# Patient Record
Sex: MALE | Race: WHITE | Employment: OTHER | URBAN - NONMETROPOLITAN AREA
[De-identification: names, ages, dates, MRNs, and addresses within clinical notes are randomized per-mention and may not be internally consistent; named-entity substitution may affect disease eponyms.]

---

## 2024-02-11 ENCOUNTER — APPOINTMENT (OUTPATIENT)
Dept: CT IMAGING | Age: 69
End: 2024-02-11
Attending: EMERGENCY MEDICINE
Payer: MEDICARE

## 2024-02-11 ENCOUNTER — HOSPITAL ENCOUNTER (EMERGENCY)
Age: 69
Discharge: HOME | End: 2024-02-11
Attending: EMERGENCY MEDICINE
Payer: MEDICARE

## 2024-02-11 VITALS
DIASTOLIC BLOOD PRESSURE: 84 MMHG | SYSTOLIC BLOOD PRESSURE: 145 MMHG | TEMPERATURE: 97.9 F | HEIGHT: 72 IN | BODY MASS INDEX: 31.15 KG/M2 | WEIGHT: 230 LBS | OXYGEN SATURATION: 98 % | RESPIRATION RATE: 16 BRPM | HEART RATE: 70 BPM

## 2024-02-11 DIAGNOSIS — S09.92XA INJURY OF NOSE, INITIAL ENCOUNTER: ICD-10-CM

## 2024-02-11 DIAGNOSIS — S09.90XA INJURY OF HEAD, INITIAL ENCOUNTER: ICD-10-CM
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
## 2024-02-11 PROCEDURE — 70486 CT MAXILLOFACIAL W/O DYE: CPT

## 2024-02-11 PROCEDURE — 6370000000 HC RX 637 (ALT 250 FOR IP): Performed by: EMERGENCY MEDICINE

## 2024-02-11 PROCEDURE — 90715 TDAP VACCINE 7 YRS/> IM: CPT | Performed by: EMERGENCY MEDICINE

## 2024-02-11 PROCEDURE — 70450 CT HEAD/BRAIN W/O DYE: CPT

## 2024-02-11 PROCEDURE — 99284 EMERGENCY DEPT VISIT MOD MDM: CPT | Performed by: EMERGENCY MEDICINE

## 2024-02-11 PROCEDURE — 90471 IMMUNIZATION ADMIN: CPT | Performed by: EMERGENCY MEDICINE

## 2024-02-11 PROCEDURE — 72125 CT NECK SPINE W/O DYE: CPT

## 2024-02-11 PROCEDURE — 6360000002 HC RX W HCPCS: Performed by: EMERGENCY MEDICINE

## 2024-02-11 RX ORDER — ONDANSETRON 4 MG/1
4 TABLET, ORALLY DISINTEGRATING ORAL ONCE
Status: COMPLETED | OUTPATIENT
Start: 2024-02-11 | End: 2024-02-11

## 2024-02-11 RX ORDER — ACETAMINOPHEN 500 MG
1000 TABLET ORAL ONCE
Status: COMPLETED | OUTPATIENT
Start: 2024-02-11 | End: 2024-02-11

## 2024-02-11 RX ADMIN — TETANUS TOXOID, REDUCED DIPHTHERIA TOXOID AND ACELLULAR PERTUSSIS VACCINE, ADSORBED 0.5 ML: 5; 2.5; 8; 8; 2.5 SUSPENSION INTRAMUSCULAR at 15:06

## 2024-02-11 RX ADMIN — ACETAMINOPHEN 1000 MG: 500 TABLET ORAL at 15:06

## 2024-02-11 RX ADMIN — ONDANSETRON 4 MG: 4 TABLET, ORALLY DISINTEGRATING ORAL at 15:06

## 2024-02-11 NOTE — ED PROVIDER NOTES
[facility] URBANA      TRIAGE CHIEF COMPLAINT:   Fall (Pt states he was taking a walk and tripped and fell. Abrasion noted to the tip of nose and bridge of nose . Some bleeding noted from abrasion . Pt denies LOC, denies head or neck pain)      [place]:  Malvin Fitzgerald is a 68 y.o. male that presents with complaint of fall facial injury, headache.  Patient prior to arrival was walking when he tripped and fell over a curb hit his face, nose had some blood from his nose, of the skin not the nostrils.  He did not pass out has a headache.  He takes aspirin, no other blood thinners.  Did not pass out no neck pain chest pain shortness of breath abdominal pain no other weakness or numbness no dizziness just a slight headache.  He walked in without difficulty.  Not sure about tetanus shot..    REVIEW OF SYSTEMS:  At least 10 systems reviewed and otherwise acutely negative except as in the [place].    Review of Systems   Constitutional: Negative.    HENT:  Positive for facial swelling and nosebleeds.    Eyes: Negative.    Respiratory: Negative.     Cardiovascular: Negative.    Gastrointestinal: Negative.    Endocrine: Negative.    Genitourinary: Negative.    Musculoskeletal: Negative.    Skin:  Positive for wound.   Allergic/Immunologic: Negative.    Neurological:  Positive for headaches.   Hematological: Negative.    Psychiatric/Behavioral: Negative.  Negative for agitation.    All other systems reviewed and are negative.      Past Medical History:   Diagnosis Date    Hypertension      Past Surgical History:   Procedure Laterality Date    BACK SURGERY      HERNIA REPAIR       History reviewed. No pertinent family history.  Social History     Socioeconomic History    Marital status:      Spouse name: Not on file    Number of children: Not on file    Years of education: Not on file    Highest education level: Not on file   Occupational History    Not on file   Tobacco Use    Smoking status: Never

## 2024-02-13 ENCOUNTER — OFFICE VISIT (OUTPATIENT)
Dept: INTERNAL MEDICINE CLINIC | Age: 69
End: 2024-02-13
Payer: MEDICARE

## 2024-02-13 VITALS
SYSTOLIC BLOOD PRESSURE: 142 MMHG | BODY MASS INDEX: 32.64 KG/M2 | HEIGHT: 72 IN | DIASTOLIC BLOOD PRESSURE: 86 MMHG | OXYGEN SATURATION: 99 % | WEIGHT: 241 LBS | HEART RATE: 65 BPM

## 2024-02-13 DIAGNOSIS — E87.6 HYPOKALEMIA: ICD-10-CM

## 2024-02-13 DIAGNOSIS — S09.93XD FACIAL INJURY, SUBSEQUENT ENCOUNTER: Primary | ICD-10-CM

## 2024-02-13 DIAGNOSIS — H57.02 PUPILS OF DIFFERENT SIZE: ICD-10-CM

## 2024-02-13 PROCEDURE — 99204 OFFICE O/P NEW MOD 45 MIN: CPT | Performed by: PHYSICIAN ASSISTANT

## 2024-02-13 PROCEDURE — 3017F COLORECTAL CA SCREEN DOC REV: CPT | Performed by: PHYSICIAN ASSISTANT

## 2024-02-13 PROCEDURE — G8484 FLU IMMUNIZE NO ADMIN: HCPCS | Performed by: PHYSICIAN ASSISTANT

## 2024-02-13 PROCEDURE — 1036F TOBACCO NON-USER: CPT | Performed by: PHYSICIAN ASSISTANT

## 2024-02-13 PROCEDURE — G8427 DOCREV CUR MEDS BY ELIG CLIN: HCPCS | Performed by: PHYSICIAN ASSISTANT

## 2024-02-13 PROCEDURE — G8417 CALC BMI ABV UP PARAM F/U: HCPCS | Performed by: PHYSICIAN ASSISTANT

## 2024-02-13 PROCEDURE — 1123F ACP DISCUSS/DSCN MKR DOCD: CPT | Performed by: PHYSICIAN ASSISTANT

## 2024-02-13 RX ORDER — EMPAGLIFLOZIN 25 MG/1
25 TABLET, FILM COATED ORAL EVERY MORNING
COMMUNITY

## 2024-02-13 RX ORDER — OMEPRAZOLE 40 MG/1
40 CAPSULE, DELAYED RELEASE ORAL 2 TIMES DAILY
COMMUNITY

## 2024-02-13 RX ORDER — POTASSIUM CHLORIDE 750 MG/1
10 CAPSULE, EXTENDED RELEASE ORAL DAILY
COMMUNITY
End: 2024-02-13 | Stop reason: SDUPTHER

## 2024-02-13 RX ORDER — ALLOPURINOL 300 MG/1
450 TABLET ORAL EVERY EVENING
COMMUNITY

## 2024-02-13 RX ORDER — POTASSIUM CHLORIDE 750 MG/1
10 CAPSULE, EXTENDED RELEASE ORAL DAILY
Qty: 90 CAPSULE | Refills: 0 | Status: SHIPPED | OUTPATIENT
Start: 2024-02-13

## 2024-02-13 RX ORDER — METOPROLOL SUCCINATE 25 MG/1
25 TABLET, EXTENDED RELEASE ORAL DAILY
COMMUNITY

## 2024-02-13 RX ORDER — AMITRIPTYLINE HYDROCHLORIDE 25 MG/1
1 TABLET, FILM COATED ORAL NIGHTLY
COMMUNITY

## 2024-02-13 RX ORDER — MELOXICAM 15 MG/1
15 TABLET ORAL DAILY
COMMUNITY

## 2024-02-13 RX ORDER — TESTOSTERONE 20.25 MG/1.25G
GEL TOPICAL
COMMUNITY
Start: 2024-01-09

## 2024-02-13 RX ORDER — OXYBUTYNIN CHLORIDE 5 MG/1
10 TABLET, EXTENDED RELEASE ORAL EVERY EVENING
COMMUNITY

## 2024-02-13 RX ORDER — BUMETANIDE 2 MG/1
2 TABLET ORAL SEE ADMIN INSTRUCTIONS
COMMUNITY

## 2024-02-13 RX ORDER — DULAGLUTIDE 1.5 MG/.5ML
1.5 INJECTION, SOLUTION SUBCUTANEOUS WEEKLY
COMMUNITY

## 2024-02-13 RX ORDER — BLOOD SUGAR DIAGNOSTIC
STRIP MISCELLANEOUS
COMMUNITY
Start: 2023-12-28

## 2024-02-13 RX ORDER — NIFEDIPINE 60 MG/1
60 TABLET, EXTENDED RELEASE ORAL DAILY
COMMUNITY

## 2024-02-13 RX ORDER — IRBESARTAN 300 MG/1
300 TABLET ORAL DAILY
COMMUNITY

## 2024-02-13 RX ORDER — ATORVASTATIN CALCIUM 40 MG/1
1 TABLET, FILM COATED ORAL DAILY
COMMUNITY

## 2024-02-13 NOTE — PROGRESS NOTES
Malvin Fitzgerald (:  1955) is a 68 y.o. male,New patient, here for evaluation of the following chief complaint(s):    No chief complaint on file.    This is my first patient encounter with Malvin Fitzgerald; chart reviewed.     SUBJECTIVE/OBJECTIVE:  HPI  Malvin Fitzgerald is a pleasant 68 y.o. male presenting to clinic today for ER follow-up/fall.     Patient seen in emergency department 2024 for fall, patient states he was taking a walk and tripped and fell forward onto his face, had abrasions to nose; patient denied syncope or headache.  Patient was given Zofran, Tylenol and updated tetanus booster.  CT imaging of head and neck was normal without acute changes.  Patient reports that after emergency department visit, they noticed that his right pupil seemed larger than his left.  Patient denies any double vision or vision changes, states that his headache has resolved, denies any lightheadedness, dizziness or syncope.  Denies any otic drainage or nasal drainage.  Denies any repeat trauma.  Denies any nausea or vomiting.  Patient states he had corneal replacement surgery and cataract surgery as a child after a traumatic injury to his right eye.  Is not sure if his right pupil is usually larger than his left.  Reports that bruising has spread to periorbital areas and nose, denies pain at this time.        CT head and facial bones 2024:  IMPRESSION:  1. No acute intracranial abnormality.  2. No evidence of acute facial bone fracture.  3. Chronic left maxillary sinusitis.    CT cervical spine 2024:  IMPRESSION:  1. Straightening of the normal curvature with normal alignment.  No acute  fracture.  2. Multilevel degenerative disc and asymmetric degenerative joint disease  (left greater than right).         Allergies   Allergen Reactions    Elemental Sulfur     Sulphasomidine Hives    Augmentin [Amoxicillin-Pot Clavulanate] Rash    Pcn [Penicillins] Rash       Current Outpatient Medications

## 2024-05-05 DIAGNOSIS — E87.6 HYPOKALEMIA: ICD-10-CM

## 2024-05-06 RX ORDER — POTASSIUM CHLORIDE 750 MG/1
10 CAPSULE, EXTENDED RELEASE ORAL DAILY
Qty: 90 CAPSULE | Refills: 0 | OUTPATIENT
Start: 2024-05-06

## 2025-03-07 ENCOUNTER — OFFICE VISIT (OUTPATIENT)
Age: 70
End: 2025-03-07
Payer: MEDICARE

## 2025-03-07 VITALS
SYSTOLIC BLOOD PRESSURE: 138 MMHG | BODY MASS INDEX: 32.51 KG/M2 | WEIGHT: 240 LBS | HEART RATE: 57 BPM | DIASTOLIC BLOOD PRESSURE: 80 MMHG | HEIGHT: 72 IN | OXYGEN SATURATION: 99 %

## 2025-03-07 DIAGNOSIS — Z80.42 FAMILY HISTORY OF PROSTATE CANCER IN FATHER: ICD-10-CM

## 2025-03-07 DIAGNOSIS — R19.09 SUPRAPUBIC MASS: Primary | ICD-10-CM

## 2025-03-07 PROCEDURE — 3017F COLORECTAL CA SCREEN DOC REV: CPT | Performed by: NURSE PRACTITIONER

## 2025-03-07 PROCEDURE — 99212 OFFICE O/P EST SF 10 MIN: CPT | Performed by: NURSE PRACTITIONER

## 2025-03-07 PROCEDURE — 1036F TOBACCO NON-USER: CPT | Performed by: NURSE PRACTITIONER

## 2025-03-07 PROCEDURE — G8427 DOCREV CUR MEDS BY ELIG CLIN: HCPCS | Performed by: NURSE PRACTITIONER

## 2025-03-07 PROCEDURE — G8417 CALC BMI ABV UP PARAM F/U: HCPCS | Performed by: NURSE PRACTITIONER

## 2025-03-07 PROCEDURE — 1160F RVW MEDS BY RX/DR IN RCRD: CPT | Performed by: NURSE PRACTITIONER

## 2025-03-07 PROCEDURE — 1123F ACP DISCUSS/DSCN MKR DOCD: CPT | Performed by: NURSE PRACTITIONER

## 2025-03-07 PROCEDURE — 1159F MED LIST DOCD IN RCRD: CPT | Performed by: NURSE PRACTITIONER

## 2025-03-07 ASSESSMENT — ENCOUNTER SYMPTOMS
COUGH: 0
SORE THROAT: 0
EYE PAIN: 0
DIARRHEA: 0
NAIL CHANGES: 0
VOMITING: 0
ABDOMINAL PAIN: 0
RHINORRHEA: 0
SHORTNESS OF BREATH: 0

## 2025-03-07 NOTE — PROGRESS NOTES
Malvin Fitzgerald (:  1955) is a 69 y.o. male,Established patient, here for evaluation of the following chief complaint(s):    Other (Growth on penis. Only noticed when has erection. Firtst notice 1 week ago. )      SUBJECTIVE/OBJECTIVE:  Pt presents with c/o as stated below-     Skin Problem  This is a new problem. The current episode started 1 to 4 weeks ago (one week). The problem is unchanged. Location: growth on groin at base of penis can only be felt when penis is erect. He was exposed to nothing. Pertinent negatives include no anorexia, congestion, cough, diarrhea, eye pain, facial edema, fatigue, fever, joint pain, nail changes, rhinorrhea, shortness of breath, sore throat or vomiting.       Allergies   Allergen Reactions    Elemental Sulfur     Sulphasomidine Hives    Augmentin [Amoxicillin-Pot Clavulanate] Rash    Cephalosporins Rash    Pcn [Penicillins] Rash        Current Outpatient Medications   Medication Sig Dispense Refill    allopurinol (ZYLOPRIM) 300 MG tablet Take 1.5 tablets by mouth every evening      amitriptyline (ELAVIL) 25 MG tablet Take 1 tablet by mouth nightly      atorvastatin (LIPITOR) 40 MG tablet Take 1 tablet by mouth daily      bumetanide (BUMEX) 2 MG tablet Take 1 tablet by mouth See Admin Instructions TAKE 1 TABLET BY MOUTH 3 DAYS A WEEK AND 1/2 TABLETS BY MOUTH 4 DAYS A WEEK      TRULICITY 1.5 MG/0.5ML SC injection Inject 0.5 mLs into the skin once a week      JARDIANCE 25 MG tablet Take 1 tablet by mouth every morning      ONETOUCH ULTRA strip TEST BLOOD SUGAR ONCE PER DAY      irbesartan (AVAPRO) 300 MG tablet Take 1 tablet by mouth daily      meloxicam (MOBIC) 15 MG tablet Take 1 tablet by mouth daily      metFORMIN (GLUCOPHAGE) 500 MG tablet Take 2 tablets by mouth 2 times daily      metoprolol succinate (TOPROL XL) 25 MG extended release tablet Take 1 tablet by mouth daily      NIFEdipine (PROCARDIA XL) 60 MG extended release tablet Take 1 tablet by mouth daily

## 2025-03-20 ENCOUNTER — OFFICE VISIT (OUTPATIENT)
Age: 70
End: 2025-03-20
Payer: MEDICARE

## 2025-03-20 VITALS
SYSTOLIC BLOOD PRESSURE: 132 MMHG | HEART RATE: 70 BPM | OXYGEN SATURATION: 96 % | TEMPERATURE: 98.8 F | DIASTOLIC BLOOD PRESSURE: 68 MMHG

## 2025-03-20 DIAGNOSIS — R09.89 SYMPTOMS OF UPPER RESPIRATORY INFECTION (URI): Primary | ICD-10-CM

## 2025-03-20 DIAGNOSIS — E11.69 TYPE 2 DIABETES MELLITUS WITH OTHER SPECIFIED COMPLICATION, WITHOUT LONG-TERM CURRENT USE OF INSULIN: ICD-10-CM

## 2025-03-20 DIAGNOSIS — R35.0 URINARY FREQUENCY: ICD-10-CM

## 2025-03-20 LAB
BILIRUBIN, POC: NEGATIVE
BLOOD URINE, POC: NEGATIVE
CHP ED QC CHECK: NORMAL
CLARITY, POC: CLEAR
COLOR, POC: YELLOW
GLUCOSE BLD-MCNC: 102 MG/DL
GLUCOSE URINE, POC: NEGATIVE MG/DL
INFLUENZA A ANTIGEN, POC: NEGATIVE
INFLUENZA B ANTIGEN, POC: NEGATIVE
KETONES, POC: NEGATIVE MG/DL
LEUKOCYTE EST, POC: NEGATIVE
LOT EXPIRE DATE: NORMAL
LOT KIT NUMBER: NORMAL
NITRITE, POC: NEGATIVE
PH, POC: 5.5
PROTEIN, POC: 30 MG/DL
SARS-COV-2, POC: NORMAL
SPECIFIC GRAVITY, POC: 1.05
UROBILINOGEN, POC: 0.2 MG/DL
VALID INTERNAL CONTROL: NORMAL
VENDOR AND KIT NAME POC: NORMAL

## 2025-03-20 PROCEDURE — G8427 DOCREV CUR MEDS BY ELIG CLIN: HCPCS | Performed by: PHYSICIAN ASSISTANT

## 2025-03-20 PROCEDURE — 1123F ACP DISCUSS/DSCN MKR DOCD: CPT | Performed by: PHYSICIAN ASSISTANT

## 2025-03-20 PROCEDURE — G8417 CALC BMI ABV UP PARAM F/U: HCPCS | Performed by: PHYSICIAN ASSISTANT

## 2025-03-20 PROCEDURE — 2022F DILAT RTA XM EVC RTNOPTHY: CPT | Performed by: PHYSICIAN ASSISTANT

## 2025-03-20 PROCEDURE — 82962 GLUCOSE BLOOD TEST: CPT | Performed by: PHYSICIAN ASSISTANT

## 2025-03-20 PROCEDURE — 3046F HEMOGLOBIN A1C LEVEL >9.0%: CPT | Performed by: PHYSICIAN ASSISTANT

## 2025-03-20 PROCEDURE — 1159F MED LIST DOCD IN RCRD: CPT | Performed by: PHYSICIAN ASSISTANT

## 2025-03-20 PROCEDURE — 1036F TOBACCO NON-USER: CPT | Performed by: PHYSICIAN ASSISTANT

## 2025-03-20 PROCEDURE — 81002 URINALYSIS NONAUTO W/O SCOPE: CPT | Performed by: PHYSICIAN ASSISTANT

## 2025-03-20 PROCEDURE — 3017F COLORECTAL CA SCREEN DOC REV: CPT | Performed by: PHYSICIAN ASSISTANT

## 2025-03-20 PROCEDURE — 99214 OFFICE O/P EST MOD 30 MIN: CPT | Performed by: PHYSICIAN ASSISTANT

## 2025-03-20 PROCEDURE — 87428 SARSCOV & INF VIR A&B AG IA: CPT | Performed by: PHYSICIAN ASSISTANT

## 2025-03-20 RX ORDER — BENZONATATE 200 MG/1
200 CAPSULE ORAL 3 TIMES DAILY PRN
Qty: 30 CAPSULE | Refills: 0 | Status: SHIPPED | OUTPATIENT
Start: 2025-03-20 | End: 2025-03-30

## 2025-03-20 RX ORDER — FLUTICASONE PROPIONATE 50 MCG
2 SPRAY, SUSPENSION (ML) NASAL DAILY
Qty: 16 G | Refills: 0 | Status: SHIPPED | OUTPATIENT
Start: 2025-03-20

## 2025-03-20 RX ORDER — ALBUTEROL SULFATE 90 UG/1
2 INHALANT RESPIRATORY (INHALATION) EVERY 4 HOURS PRN
Qty: 18 G | Refills: 0 | Status: SHIPPED | OUTPATIENT
Start: 2025-03-20

## 2025-03-20 ASSESSMENT — ENCOUNTER SYMPTOMS
COLOR CHANGE: 0
EYE DISCHARGE: 0
BLOOD IN STOOL: 0
CONSTIPATION: 0
ABDOMINAL PAIN: 0
RHINORRHEA: 0
SORE THROAT: 1
EYE REDNESS: 0
VOMITING: 0
PHOTOPHOBIA: 0
WHEEZING: 0
EYE PAIN: 0
CHEST TIGHTNESS: 1
COUGH: 1
SHORTNESS OF BREATH: 0
DIARRHEA: 0
NAUSEA: 0
BACK PAIN: 0

## 2025-03-20 NOTE — PROGRESS NOTES
Malvin Fitzgerald (:  1955) is a 69 y.o. male,Established patient, here for evaluation of the following chief complaint(s):    Cough, Pharyngitis, Chest Congestion, and Nasal Congestion      ASSESSMENT/PLAN:  Assessment & Plan  1. Viral infection.  Symptoms: dry cough, sore throat, chest congestion (2 days). Exam: faint lung congestion, fluid behind eardrums. COVID-19 and influenza tests: negative. Prescribe inhaler (every 4 hours), Tessalon Perles (every 8 hours as needed), Flonase (2 sprays each nostril daily). Recommend warm salt water gargles and sinus irrigation. Return if no improvement in a week for potential antibiotics.    2. Urinary frequency.  Increased frequency at night (every 1.5 to 2 hours).  Denies dysuria or urgency or other urinary symptoms at this time.  Urine test normal, not indicative of urinary tract infection. No UTI history. Possible relation to viral infection or elevated blood sugar. Monitor symptoms, maintain hydration. Return if symptoms change or dysuria occurs.    3. Diabetes mellitus.  Blood sugar in office today is 102.  On Ozempic and metformin. Usual blood sugar 120-170, last checked a week ago. Last A1c: 6.3. Check blood sugar while sick, as illness can elevate levels.    1. Symptoms of upper respiratory infection (URI)  -     POCT COVID-19 & Influenza A/B  -     albuterol sulfate HFA (VENTOLIN HFA) 108 (90 Base) MCG/ACT inhaler; Inhale 2 puffs into the lungs every 4 hours as needed for Wheezing or Shortness of Breath (cough), Disp-18 g, R-0Normal  -     benzonatate (TESSALON) 200 MG capsule; Take 1 capsule by mouth 3 times daily as needed for Cough, Disp-30 capsule, R-0Normal  -     fluticasone (FLONASE) 50 MCG/ACT nasal spray; 2 sprays by Each Nostril route daily, Disp-16 g, R-0Normal  2. Urinary frequency  -     POCT Urinalysis no Micro  -     POCT Glucose  3. Type 2 diabetes mellitus with other specified complication, without long-term current use of insulin (HCC)  -

## 2025-07-23 ENCOUNTER — HOSPITAL ENCOUNTER (OUTPATIENT)
Age: 70
Setting detail: OBSERVATION
Discharge: HOME | End: 2025-07-24
Attending: EMERGENCY MEDICINE | Admitting: STUDENT IN AN ORGANIZED HEALTH CARE EDUCATION/TRAINING PROGRAM
Payer: MEDICARE

## 2025-07-23 ENCOUNTER — APPOINTMENT (OUTPATIENT)
Dept: GENERAL RADIOLOGY | Age: 70
End: 2025-07-23
Attending: EMERGENCY MEDICINE
Payer: MEDICARE

## 2025-07-23 DIAGNOSIS — I50.9 CHRONIC CONGESTIVE HEART FAILURE, UNSPECIFIED HEART FAILURE TYPE (HCC): ICD-10-CM

## 2025-07-23 DIAGNOSIS — R00.1 SYMPTOMATIC BRADYCARDIA: Primary | ICD-10-CM

## 2025-07-23 LAB
ALBUMIN SERPL-MCNC: 4.5 G/DL (ref 3.4–5)
ALBUMIN/GLOB SERPL: 2 {RATIO}
ALP SERPL-CCNC: 142 U/L (ref 40–129)
ALT SERPL-CCNC: 41 U/L (ref 10–40)
ANION GAP SERPL CALCULATED.3IONS-SCNC: 15 MMOL/L (ref 9–17)
AST SERPL-CCNC: 32 U/L (ref 15–37)
BASOPHILS # BLD: 0.06 K/UL
BASOPHILS NFR BLD: 1 % (ref 0–1)
BILIRUB SERPL-MCNC: 0.3 MG/DL (ref 0–1)
BUN SERPL-MCNC: 41 MG/DL (ref 7–20)
CALCIUM SERPL-MCNC: 9.7 MG/DL (ref 8.3–10.6)
CHLORIDE SERPL-SCNC: 106 MMOL/L (ref 99–110)
CO2 SERPL-SCNC: 19 MMOL/L (ref 21–32)
CREAT SERPL-MCNC: 1.6 MG/DL (ref 0.8–1.3)
EOSINOPHIL # BLD: 0.2 K/UL
EOSINOPHILS RELATIVE PERCENT: 3 % (ref 0–3)
ERYTHROCYTE [DISTWIDTH] IN BLOOD BY AUTOMATED COUNT: 13.9 % (ref 11.7–14.9)
GFR, ESTIMATED: 46 ML/MIN/1.73M2
GLUCOSE SERPL-MCNC: 117 MG/DL (ref 74–99)
HCT VFR BLD AUTO: 45.6 % (ref 42–52)
HGB BLD-MCNC: 15.1 G/DL (ref 13.5–18)
IMM GRANULOCYTES # BLD AUTO: 0.05 K/UL
IMM GRANULOCYTES NFR BLD: 1 %
LYMPHOCYTES NFR BLD: 3.11 K/UL
LYMPHOCYTES RELATIVE PERCENT: 39 % (ref 24–44)
MCH RBC QN AUTO: 30.4 PG (ref 27–31)
MCHC RBC AUTO-ENTMCNC: 33.1 G/DL (ref 32–36)
MCV RBC AUTO: 91.8 FL (ref 78–100)
MONOCYTES NFR BLD: 1.1 K/UL
MONOCYTES NFR BLD: 14 % (ref 0–5)
NEUTROPHILS NFR BLD: 43 % (ref 36–66)
NEUTS SEG NFR BLD: 3.42 K/UL
PLATELET # BLD AUTO: 200 K/UL (ref 140–440)
PMV BLD AUTO: 9.3 FL (ref 7.5–11.1)
POTASSIUM SERPL-SCNC: 5 MMOL/L (ref 3.5–5.1)
PROT SERPL-MCNC: 6.8 G/DL (ref 6.4–8.2)
RBC # BLD AUTO: 4.97 M/UL (ref 4.6–6.2)
SODIUM SERPL-SCNC: 140 MMOL/L (ref 136–145)
TROPONIN I SERPL HS-MCNC: 22 NG/L (ref 0–22)
TROPONIN I SERPL HS-MCNC: 23 NG/L (ref 0–22)
WBC OTHER # BLD: 7.9 K/UL (ref 4–10.5)

## 2025-07-23 PROCEDURE — 6360000002 HC RX W HCPCS: Performed by: EMERGENCY MEDICINE

## 2025-07-23 PROCEDURE — 93005 ELECTROCARDIOGRAM TRACING: CPT | Performed by: EMERGENCY MEDICINE

## 2025-07-23 PROCEDURE — 96361 HYDRATE IV INFUSION ADD-ON: CPT

## 2025-07-23 PROCEDURE — 84443 ASSAY THYROID STIM HORMONE: CPT

## 2025-07-23 PROCEDURE — 84484 ASSAY OF TROPONIN QUANT: CPT

## 2025-07-23 PROCEDURE — 96372 THER/PROPH/DIAG INJ SC/IM: CPT

## 2025-07-23 PROCEDURE — 6370000000 HC RX 637 (ALT 250 FOR IP)

## 2025-07-23 PROCEDURE — 2580000003 HC RX 258: Performed by: EMERGENCY MEDICINE

## 2025-07-23 PROCEDURE — G0378 HOSPITAL OBSERVATION PER HR: HCPCS

## 2025-07-23 PROCEDURE — 83880 ASSAY OF NATRIURETIC PEPTIDE: CPT

## 2025-07-23 PROCEDURE — 99285 EMERGENCY DEPT VISIT HI MDM: CPT

## 2025-07-23 PROCEDURE — 71045 X-RAY EXAM CHEST 1 VIEW: CPT

## 2025-07-23 PROCEDURE — 80053 COMPREHEN METABOLIC PANEL: CPT

## 2025-07-23 PROCEDURE — 6360000002 HC RX W HCPCS

## 2025-07-23 PROCEDURE — 85025 COMPLETE CBC W/AUTO DIFF WBC: CPT

## 2025-07-23 PROCEDURE — 6370000000 HC RX 637 (ALT 250 FOR IP): Performed by: EMERGENCY MEDICINE

## 2025-07-23 PROCEDURE — 2580000003 HC RX 258

## 2025-07-23 PROCEDURE — 2500000003 HC RX 250 WO HCPCS

## 2025-07-23 PROCEDURE — 96374 THER/PROPH/DIAG INJ IV PUSH: CPT

## 2025-07-23 RX ORDER — SODIUM CHLORIDE 9 MG/ML
INJECTION, SOLUTION INTRAVENOUS PRN
Status: DISCONTINUED | OUTPATIENT
Start: 2025-07-23 | End: 2025-07-24 | Stop reason: HOSPADM

## 2025-07-23 RX ORDER — METOPROLOL SUCCINATE 25 MG/1
25 TABLET, EXTENDED RELEASE ORAL DAILY
Status: DISCONTINUED | OUTPATIENT
Start: 2025-07-24 | End: 2025-07-24 | Stop reason: HOSPADM

## 2025-07-23 RX ORDER — SODIUM CHLORIDE 0.9 % (FLUSH) 0.9 %
5-40 SYRINGE (ML) INJECTION EVERY 12 HOURS SCHEDULED
Status: DISCONTINUED | OUTPATIENT
Start: 2025-07-23 | End: 2025-07-24 | Stop reason: HOSPADM

## 2025-07-23 RX ORDER — BUMETANIDE 1 MG/1
2 TABLET ORAL SEE ADMIN INSTRUCTIONS
Status: DISCONTINUED | OUTPATIENT
Start: 2025-07-23 | End: 2025-07-24 | Stop reason: HOSPADM

## 2025-07-23 RX ORDER — NIFEDIPINE 30 MG/1
60 TABLET, EXTENDED RELEASE ORAL DAILY
Status: DISCONTINUED | OUTPATIENT
Start: 2025-07-24 | End: 2025-07-24 | Stop reason: HOSPADM

## 2025-07-23 RX ORDER — SODIUM CHLORIDE, SODIUM LACTATE, POTASSIUM CHLORIDE, CALCIUM CHLORIDE 600; 310; 30; 20 MG/100ML; MG/100ML; MG/100ML; MG/100ML
INJECTION, SOLUTION INTRAVENOUS CONTINUOUS
Status: DISPENSED | OUTPATIENT
Start: 2025-07-23 | End: 2025-07-24

## 2025-07-23 RX ORDER — ONDANSETRON 4 MG/1
4 TABLET, ORALLY DISINTEGRATING ORAL EVERY 8 HOURS PRN
Status: DISCONTINUED | OUTPATIENT
Start: 2025-07-23 | End: 2025-07-24 | Stop reason: HOSPADM

## 2025-07-23 RX ORDER — SODIUM CHLORIDE, SODIUM LACTATE, POTASSIUM CHLORIDE, CALCIUM CHLORIDE 600; 310; 30; 20 MG/100ML; MG/100ML; MG/100ML; MG/100ML
INJECTION, SOLUTION INTRAVENOUS CONTINUOUS
Status: DISCONTINUED | OUTPATIENT
Start: 2025-07-23 | End: 2025-07-23

## 2025-07-23 RX ORDER — POLYETHYLENE GLYCOL 3350 17 G/17G
17 POWDER, FOR SOLUTION ORAL DAILY PRN
Status: DISCONTINUED | OUTPATIENT
Start: 2025-07-23 | End: 2025-07-24 | Stop reason: HOSPADM

## 2025-07-23 RX ORDER — INSULIN LISPRO 100 [IU]/ML
0-4 INJECTION, SOLUTION INTRAVENOUS; SUBCUTANEOUS
Status: DISCONTINUED | OUTPATIENT
Start: 2025-07-23 | End: 2025-07-24 | Stop reason: HOSPADM

## 2025-07-23 RX ORDER — GLUCAGON 1 MG/ML
1 KIT INJECTION PRN
Status: DISCONTINUED | OUTPATIENT
Start: 2025-07-23 | End: 2025-07-24 | Stop reason: HOSPADM

## 2025-07-23 RX ORDER — SODIUM CHLORIDE, SODIUM LACTATE, POTASSIUM CHLORIDE, CALCIUM CHLORIDE 600; 310; 30; 20 MG/100ML; MG/100ML; MG/100ML; MG/100ML
1000 INJECTION, SOLUTION INTRAVENOUS ONCE
Status: COMPLETED | OUTPATIENT
Start: 2025-07-23 | End: 2025-07-23

## 2025-07-23 RX ORDER — ATROPINE SULFATE 0.1 MG/ML
0.5 INJECTION INTRAVENOUS ONCE
Status: COMPLETED | OUTPATIENT
Start: 2025-07-23 | End: 2025-07-23

## 2025-07-23 RX ORDER — ONDANSETRON 2 MG/ML
4 INJECTION INTRAMUSCULAR; INTRAVENOUS EVERY 6 HOURS PRN
Status: DISCONTINUED | OUTPATIENT
Start: 2025-07-23 | End: 2025-07-24 | Stop reason: HOSPADM

## 2025-07-23 RX ORDER — ASPIRIN 81 MG/1
324 TABLET, CHEWABLE ORAL ONCE
Status: COMPLETED | OUTPATIENT
Start: 2025-07-23 | End: 2025-07-23

## 2025-07-23 RX ORDER — ATORVASTATIN CALCIUM 40 MG/1
40 TABLET, FILM COATED ORAL DAILY
Status: DISCONTINUED | OUTPATIENT
Start: 2025-07-23 | End: 2025-07-24

## 2025-07-23 RX ORDER — SPIRONOLACTONE 25 MG/1
25 TABLET ORAL DAILY
COMMUNITY

## 2025-07-23 RX ORDER — PANTOPRAZOLE SODIUM 40 MG/1
40 TABLET, DELAYED RELEASE ORAL
Status: DISCONTINUED | OUTPATIENT
Start: 2025-07-24 | End: 2025-07-24 | Stop reason: HOSPADM

## 2025-07-23 RX ORDER — ENOXAPARIN SODIUM 100 MG/ML
30 INJECTION SUBCUTANEOUS 2 TIMES DAILY
Status: DISCONTINUED | OUTPATIENT
Start: 2025-07-23 | End: 2025-07-24 | Stop reason: HOSPADM

## 2025-07-23 RX ORDER — SPIRONOLACTONE 25 MG/1
25 TABLET ORAL DAILY
Status: DISCONTINUED | OUTPATIENT
Start: 2025-07-24 | End: 2025-07-24 | Stop reason: HOSPADM

## 2025-07-23 RX ORDER — DEXTROSE MONOHYDRATE 100 MG/ML
INJECTION, SOLUTION INTRAVENOUS CONTINUOUS PRN
Status: DISCONTINUED | OUTPATIENT
Start: 2025-07-23 | End: 2025-07-24 | Stop reason: HOSPADM

## 2025-07-23 RX ORDER — MELOXICAM 7.5 MG/1
15 TABLET ORAL DAILY
Status: DISCONTINUED | OUTPATIENT
Start: 2025-07-24 | End: 2025-07-24 | Stop reason: HOSPADM

## 2025-07-23 RX ORDER — LOSARTAN POTASSIUM 100 MG/1
100 TABLET ORAL DAILY
Status: DISCONTINUED | OUTPATIENT
Start: 2025-07-24 | End: 2025-07-24 | Stop reason: HOSPADM

## 2025-07-23 RX ORDER — ACETAMINOPHEN 650 MG/1
650 SUPPOSITORY RECTAL EVERY 6 HOURS PRN
Status: DISCONTINUED | OUTPATIENT
Start: 2025-07-23 | End: 2025-07-24 | Stop reason: HOSPADM

## 2025-07-23 RX ORDER — SODIUM CHLORIDE 0.9 % (FLUSH) 0.9 %
5-40 SYRINGE (ML) INJECTION PRN
Status: DISCONTINUED | OUTPATIENT
Start: 2025-07-23 | End: 2025-07-24 | Stop reason: HOSPADM

## 2025-07-23 RX ORDER — ACETAMINOPHEN 325 MG/1
650 TABLET ORAL EVERY 6 HOURS PRN
Status: DISCONTINUED | OUTPATIENT
Start: 2025-07-23 | End: 2025-07-24 | Stop reason: HOSPADM

## 2025-07-23 RX ADMIN — ALLOPURINOL 400 MG: 100 TABLET ORAL at 23:05

## 2025-07-23 RX ADMIN — SODIUM CHLORIDE, SODIUM LACTATE, POTASSIUM CHLORIDE, AND CALCIUM CHLORIDE 1000 ML: .6; .31; .03; .02 INJECTION, SOLUTION INTRAVENOUS at 20:01

## 2025-07-23 RX ADMIN — ATROPINE SULFATE 0.5 MG: 0.1 INJECTION INTRAVENOUS at 19:11

## 2025-07-23 RX ADMIN — ENOXAPARIN SODIUM 30 MG: 100 INJECTION SUBCUTANEOUS at 23:05

## 2025-07-23 RX ADMIN — SODIUM CHLORIDE, SODIUM LACTATE, POTASSIUM CHLORIDE, AND CALCIUM CHLORIDE: .6; .31; .03; .02 INJECTION, SOLUTION INTRAVENOUS at 23:10

## 2025-07-23 RX ADMIN — ASPIRIN 324 MG: 81 TABLET, CHEWABLE ORAL at 19:20

## 2025-07-23 RX ADMIN — SODIUM CHLORIDE, PRESERVATIVE FREE 10 ML: 5 INJECTION INTRAVENOUS at 23:10

## 2025-07-23 RX ADMIN — ATORVASTATIN CALCIUM 40 MG: 40 TABLET, FILM COATED ORAL at 23:07

## 2025-07-23 RX ADMIN — AMITRIPTYLINE HYDROCHLORIDE 25 MG: 25 TABLET ORAL at 23:06

## 2025-07-23 ASSESSMENT — LIFESTYLE VARIABLES
HOW OFTEN DO YOU HAVE A DRINK CONTAINING ALCOHOL: NEVER
HOW MANY STANDARD DRINKS CONTAINING ALCOHOL DO YOU HAVE ON A TYPICAL DAY: PATIENT DOES NOT DRINK

## 2025-07-23 NOTE — ED PROVIDER NOTES
Triage Chief Complaint:   Dizziness (X 5 DAYS) and Chest Pain (X 5 DAYS)    [place]:  Malvin Fitzgerald is a 70 y.o. male that presents with chest pain and lightheadedness.  Patient states that over the past 5 days he has had intermittent lightheadedness and central chest pressure that comes and goes without alleviating or exacerbating factors but worsened today and has been present for most of the day.  No shortness of breath, cough, fever, nausea, vomiting, diarrhea.  No recent medication changes.  He is on metoprolol and nifedipine for history of hypertension.  No known coronary artery disease.  Has not had symptoms like this before in the past.    ROS:  At least 6 systems reviewed and otherwise acutely negative except as in the [place].    Past Medical History:   Diagnosis Date    Hypertension      Past Surgical History:   Procedure Laterality Date    BACK SURGERY      HERNIA REPAIR       No family history on file.  Social History     Socioeconomic History    Marital status:      Spouse name: Not on file    Number of children: Not on file    Years of education: Not on file    Highest education level: Not on file   Occupational History    Not on file   Tobacco Use    Smoking status: Never    Smokeless tobacco: Never   Substance and Sexual Activity    Alcohol use: Never    Drug use: Not on file    Sexual activity: Not on file   Other Topics Concern    Not on file   Social History Narrative    Not on file     Social Drivers of Health     Financial Resource Strain: Not on file   Food Insecurity: Not on file   Transportation Needs: Not on file   Physical Activity: Not on file   Stress: Not on file   Social Connections: Not on file   Intimate Partner Violence: Not on file   Housing Stability: Not on file     Current Facility-Administered Medications   Medication Dose Route Frequency Provider Last Rate Last Admin    lactated ringers bolus 1,000 mL  1,000 mL IntraVENous Once Kel Mott MD         Current Outpatient

## 2025-07-24 ENCOUNTER — ANCILLARY ORDERS (OUTPATIENT)
Dept: CARDIOLOGY | Age: 70
End: 2025-07-24

## 2025-07-24 ENCOUNTER — APPOINTMENT (OUTPATIENT)
Age: 70
End: 2025-07-24
Payer: MEDICARE

## 2025-07-24 VITALS
OXYGEN SATURATION: 99 % | WEIGHT: 236 LBS | RESPIRATION RATE: 14 BRPM | TEMPERATURE: 97.9 F | SYSTOLIC BLOOD PRESSURE: 134 MMHG | DIASTOLIC BLOOD PRESSURE: 86 MMHG | HEART RATE: 60 BPM | HEIGHT: 72 IN | BODY MASS INDEX: 31.97 KG/M2

## 2025-07-24 DIAGNOSIS — R55 VASOVAGAL SYNCOPE: Primary | ICD-10-CM

## 2025-07-24 DIAGNOSIS — R07.2 PRECORDIAL PAIN: ICD-10-CM

## 2025-07-24 LAB
ANION GAP SERPL CALCULATED.3IONS-SCNC: 10 MMOL/L (ref 9–17)
BASOPHILS # BLD: 0.05 K/UL
BASOPHILS NFR BLD: 1 % (ref 0–1)
BNP SERPL-MCNC: 150 PG/ML (ref 0–125)
BUN SERPL-MCNC: 32 MG/DL (ref 7–20)
CALCIUM SERPL-MCNC: 9.3 MG/DL (ref 8.3–10.6)
CHLORIDE SERPL-SCNC: 107 MMOL/L (ref 99–110)
CO2 SERPL-SCNC: 21 MMOL/L (ref 21–32)
CREAT SERPL-MCNC: 1.3 MG/DL (ref 0.8–1.3)
ECHO AO ROOT DIAM: 3.4 CM
ECHO AO ROOT INDEX: 1.48 CM/M2
ECHO AV AREA PEAK VELOCITY: 4.2 CM2
ECHO AV AREA VTI: 4.4 CM2
ECHO AV AREA/BSA PEAK VELOCITY: 1.8 CM2/M2
ECHO AV AREA/BSA VTI: 1.9 CM2/M2
ECHO AV MEAN GRADIENT: 2 MMHG
ECHO AV MEAN VELOCITY: 0.7 M/S
ECHO AV PEAK GRADIENT: 4 MMHG
ECHO AV PEAK VELOCITY: 1 M/S
ECHO AV VELOCITY RATIO: 0.9
ECHO AV VTI: 20.5 CM
ECHO BSA: 2.33 M2
ECHO EST RA PRESSURE: 3 MMHG
ECHO LA AREA 4C: 16.1 CM2
ECHO LA DIAMETER INDEX: 1.53 CM/M2
ECHO LA DIAMETER: 3.5 CM
ECHO LA MAJOR AXIS: 5.5 CM
ECHO LA TO AORTIC ROOT RATIO: 1.03
ECHO LA VOL MOD A4C: 33 ML (ref 18–58)
ECHO LA VOLUME INDEX MOD A4C: 14 ML/M2 (ref 16–34)
ECHO LV E' LATERAL VELOCITY: 10.3 CM/S
ECHO LV E' SEPTAL VELOCITY: 6.09 CM/S
ECHO LV EDV A4C: 125 ML
ECHO LV EDV INDEX A4C: 55 ML/M2
ECHO LV EF PHYSICIAN: 50 %
ECHO LV EJECTION FRACTION A4C: 55 %
ECHO LV ESV A4C: 56 ML
ECHO LV ESV INDEX A4C: 24 ML/M2
ECHO LV FRACTIONAL SHORTENING: 29 % (ref 28–44)
ECHO LV INTERNAL DIMENSION DIASTOLE INDEX: 1.97 CM/M2
ECHO LV INTERNAL DIMENSION DIASTOLIC: 4.5 CM (ref 4.2–5.9)
ECHO LV INTERNAL DIMENSION SYSTOLIC INDEX: 1.4 CM/M2
ECHO LV INTERNAL DIMENSION SYSTOLIC: 3.2 CM
ECHO LV IVSD: 1.1 CM (ref 0.6–1)
ECHO LV MASS 2D: 153.3 G (ref 88–224)
ECHO LV MASS INDEX 2D: 66.9 G/M2 (ref 49–115)
ECHO LV POSTERIOR WALL DIASTOLIC: 0.9 CM (ref 0.6–1)
ECHO LV RELATIVE WALL THICKNESS RATIO: 0.4
ECHO LVOT AREA: 4.5 CM2
ECHO LVOT AV VTI INDEX: 0.97
ECHO LVOT DIAM: 2.4 CM
ECHO LVOT MEAN GRADIENT: 2 MMHG
ECHO LVOT PEAK GRADIENT: 3 MMHG
ECHO LVOT PEAK VELOCITY: 0.9 M/S
ECHO LVOT STROKE VOLUME INDEX: 39.1 ML/M2
ECHO LVOT SV: 89.5 ML
ECHO LVOT VTI: 19.8 CM
ECHO MV A VELOCITY: 0.91 M/S
ECHO MV E VELOCITY: 0.49 M/S
ECHO MV E/A RATIO: 0.54
ECHO MV E/E' LATERAL: 4.76
ECHO MV E/E' RATIO (AVERAGED): 6.4
ECHO MV E/E' SEPTAL: 8.05
ECHO RIGHT VENTRICULAR SYSTOLIC PRESSURE (RVSP): 7 MMHG
ECHO RV INTERNAL DIMENSION: 3.6 CM
ECHO TV REGURGITANT MAX VELOCITY: 0.98 M/S
ECHO TV REGURGITANT PEAK GRADIENT: 4 MMHG
EKG ATRIAL RATE: 33 BPM
EKG ATRIAL RATE: 74 BPM
EKG DIAGNOSIS: NORMAL
EKG DIAGNOSIS: NORMAL
EKG P AXIS: 37 DEGREES
EKG P AXIS: 47 DEGREES
EKG P-R INTERVAL: 200 MS
EKG P-R INTERVAL: 226 MS
EKG Q-T INTERVAL: 402 MS
EKG Q-T INTERVAL: 460 MS
EKG QRS DURATION: 126 MS
EKG QRS DURATION: 126 MS
EKG QTC CALCULATION (BAZETT): 340 MS
EKG QTC CALCULATION (BAZETT): 446 MS
EKG R AXIS: -2 DEGREES
EKG R AXIS: 23 DEGREES
EKG T AXIS: -21 DEGREES
EKG T AXIS: -6 DEGREES
EKG VENTRICULAR RATE: 33 BPM
EKG VENTRICULAR RATE: 74 BPM
EOSINOPHIL # BLD: 0.2 K/UL
EOSINOPHILS RELATIVE PERCENT: 4 % (ref 0–3)
ERYTHROCYTE [DISTWIDTH] IN BLOOD BY AUTOMATED COUNT: 13.9 % (ref 11.7–14.9)
EST. AVERAGE GLUCOSE BLD GHB EST-MCNC: 128 MG/DL
GFR, ESTIMATED: 58 ML/MIN/1.73M2
GLUCOSE BLD-MCNC: 109 MG/DL (ref 74–99)
GLUCOSE BLD-MCNC: 99 MG/DL (ref 74–99)
GLUCOSE SERPL-MCNC: 96 MG/DL (ref 74–99)
HBA1C MFR BLD: 6.1 % (ref 4.2–6.3)
HCT VFR BLD AUTO: 40.5 % (ref 42–52)
HGB BLD-MCNC: 13.4 G/DL (ref 13.5–18)
IMM GRANULOCYTES # BLD AUTO: 0.02 K/UL
IMM GRANULOCYTES NFR BLD: 0 %
LYMPHOCYTES NFR BLD: 2.27 K/UL
LYMPHOCYTES RELATIVE PERCENT: 41 % (ref 24–44)
MCH RBC QN AUTO: 30.5 PG (ref 27–31)
MCHC RBC AUTO-ENTMCNC: 33.1 G/DL (ref 32–36)
MCV RBC AUTO: 92 FL (ref 78–100)
MONOCYTES NFR BLD: 0.64 K/UL
MONOCYTES NFR BLD: 12 % (ref 0–5)
NEUTROPHILS NFR BLD: 43 % (ref 36–66)
NEUTS SEG NFR BLD: 2.4 K/UL
PLATELET # BLD AUTO: 167 K/UL (ref 140–440)
PMV BLD AUTO: 9.7 FL (ref 7.5–11.1)
POTASSIUM SERPL-SCNC: 5.1 MMOL/L (ref 3.5–5.1)
RBC # BLD AUTO: 4.4 M/UL (ref 4.6–6.2)
SODIUM SERPL-SCNC: 139 MMOL/L (ref 136–145)
TSH SERPL DL<=0.05 MIU/L-ACNC: 1.25 UIU/ML (ref 0.27–4.2)
WBC OTHER # BLD: 5.6 K/UL (ref 4–10.5)

## 2025-07-24 PROCEDURE — 96372 THER/PROPH/DIAG INJ SC/IM: CPT

## 2025-07-24 PROCEDURE — 82962 GLUCOSE BLOOD TEST: CPT

## 2025-07-24 PROCEDURE — 80048 BASIC METABOLIC PNL TOTAL CA: CPT

## 2025-07-24 PROCEDURE — 6360000002 HC RX W HCPCS

## 2025-07-24 PROCEDURE — 93306 TTE W/DOPPLER COMPLETE: CPT | Performed by: INTERNAL MEDICINE

## 2025-07-24 PROCEDURE — G0378 HOSPITAL OBSERVATION PER HR: HCPCS

## 2025-07-24 PROCEDURE — 93010 ELECTROCARDIOGRAM REPORT: CPT | Performed by: INTERNAL MEDICINE

## 2025-07-24 PROCEDURE — 36415 COLL VENOUS BLD VENIPUNCTURE: CPT

## 2025-07-24 PROCEDURE — 93306 TTE W/DOPPLER COMPLETE: CPT

## 2025-07-24 PROCEDURE — 6370000000 HC RX 637 (ALT 250 FOR IP)

## 2025-07-24 PROCEDURE — 97161 PT EVAL LOW COMPLEX 20 MIN: CPT

## 2025-07-24 PROCEDURE — 97530 THERAPEUTIC ACTIVITIES: CPT

## 2025-07-24 PROCEDURE — 83036 HEMOGLOBIN GLYCOSYLATED A1C: CPT

## 2025-07-24 PROCEDURE — 96361 HYDRATE IV INFUSION ADD-ON: CPT

## 2025-07-24 PROCEDURE — 94761 N-INVAS EAR/PLS OXIMETRY MLT: CPT

## 2025-07-24 PROCEDURE — 85025 COMPLETE CBC W/AUTO DIFF WBC: CPT

## 2025-07-24 PROCEDURE — 99223 1ST HOSP IP/OBS HIGH 75: CPT | Performed by: INTERNAL MEDICINE

## 2025-07-24 PROCEDURE — 93005 ELECTROCARDIOGRAM TRACING: CPT | Performed by: INTERNAL MEDICINE

## 2025-07-24 PROCEDURE — 97165 OT EVAL LOW COMPLEX 30 MIN: CPT

## 2025-07-24 RX ORDER — ATORVASTATIN CALCIUM 40 MG/1
40 TABLET, FILM COATED ORAL NIGHTLY
Status: DISCONTINUED | OUTPATIENT
Start: 2025-07-24 | End: 2025-07-24 | Stop reason: HOSPADM

## 2025-07-24 RX ADMIN — NIFEDIPINE 60 MG: 30 TABLET, EXTENDED RELEASE ORAL at 09:17

## 2025-07-24 RX ADMIN — PANTOPRAZOLE SODIUM 40 MG: 40 TABLET, DELAYED RELEASE ORAL at 06:36

## 2025-07-24 RX ADMIN — ENOXAPARIN SODIUM 30 MG: 100 INJECTION SUBCUTANEOUS at 09:17

## 2025-07-24 ASSESSMENT — PAIN SCALES - GENERAL: PAINLEVEL_OUTOF10: 0

## 2025-07-24 NOTE — PROGRESS NOTES
Facility/Department: [facility]  Occupational Therapy Initial Assessment    Name Malvin Fitzgerald    1955   MRN [id number]   Date of Service 2025   Patient Room  -01     Patient Diagnoses The primary encounter diagnosis was Symptomatic bradycardia. A diagnosis of Chronic congestive heart failure, unspecified heart failure type (HCC) was also pertinent to this visit.   Past Medical History  has a past medical history of Hypertension.   Past Surgical History  has a past surgical history that includes back surgery and hernia repair.       Discharge Recommendations  Home with assist PRN  Equipment: None, pt reports he plans to remodel shower here in [place], has walk in shower in [place], tub/shower in [place].  May want a shower chair here, has one in [place].    Assessment  Conditions Requiring Skilled Therapeutic Intervention: Decreased functional mobility, Decreased strength, Decreased endurance, Decreased ADL status, Decreased high level ADLs/IADLs  Assessment of Evaluation: Patient is a 70 y.o. male who presents to [facility] with chest pain and lightheadedness(past 5 days intermittent chest pressure), DX wit bradycardia.  Patient presents at baseline functional level therefore will not be picking him up for OT services. History of CHF, DM2, gout, r rotator cuff tear and repair x 3, NAGI, back surgery and hernia repair.      Treatment Diagnosis: Physical Debility  Therapy Prognosis: Excellent  Decision Making: Low Complexity  Requires OT Follow-Up: Yes  Activity Tolerance: Tolerated well, ambulating without AD     Plan  General Plan: one time visit only  Treatment Initiation: Strengthening, Endurance training, Balance training, Functional mobility training, Transfer training, Neuromuscular re-education, Therapeutic activity , ADL training, IADL training, Safety education & training and Equipment evaluation/education  Safety Communication: Gait belt and Call light within reach  Wife present throughout  transfer:Contact Guard Assistance  Sitting balance:  good   Standing balance:  good     ADLs  Self Feeding: Independent  Grooming: Independent sitting  Bathing: Stand By Assistance  Toileting: Stand By Assistance  UB Dressing: Independent  LB Dressing: Modified Independent  Safety Awareness: Supervision  Home Management: Supervision    AM-PAC 6 click short form for inpatient daily activity:   How much help from another person does the patient currently need... Unable  Dep A Lot  Max A A Lot   Mod A A Little  Min A A Little   CGA  SBA None   Mod I  Indep  Sup   1.  Putting on and taking off regular lower body clothing? [] 1    [] 2   [] 2   [] 3   [] 3   [x] 4      2. Bathing (including washing, rinsing, drying)? [] 1   [] 2   [] 2 [] 3 [x] 3 [] 4   3. Toileting, which includes using toilet, bedpan, or urinal? [] 1    [] 2   [] 2   [] 3   [x] 3   [] 4     4. Putting on and taking off regular upper body clothing? [] 1   [] 2   [] 2   [] 3   [] 3    [x] 4      5. Taking care of personal grooming such as brushing teeth? [] 1   [] 2    [] 2 [] 3    [] 3   [x] 4      6. Eating meals?   [] 1   [] 2   [] 2   [] 3   [] 3   [x] 4      Raw Score:  21     [24=0% impaired(CH), 23=1-19%(CI), 20-22=20-39%(CJ), 15-19=40-59%(CK), 10-14=60-79%(CL), 7-9=80-99%(CM), 6=100%(CN)]         Goals  Patient Goals:  Time frame: 1 week or until discharge  Patient will complete functional transfers with SBA using AE PRN and with good safety awareness.  Pt will have a good understanding of home modification recommendations and AE/DME use for safe return home.      Pt met above goals during assessment, one time visit only for OT services.    Education  Given to: Patient  Education provided: Role of therapy, Plan of care, and Fall Safety   Education method: Verbal      Therapy Time   Individual Co-Eval   Time In 12:20    Time Out 12:54    Total Time 34        Signed: Bella Wetzel OT/ALFA [id number],   7/24/2025, 12:42 PM

## 2025-07-24 NOTE — PROGRESS NOTES
Physical Therapy  Facility/Department: [facility]  Physical Therapy Initial Assessment    Name: Malvin Fitzgerald  : 1955  MRN: [id number]  Date of Service: 2025    Discharge Recommendations:  Home independently   PT Equipment Recommendations  Equipment Needed: No      Patient Diagnosis(es): The primary encounter diagnosis was Symptomatic bradycardia. A diagnosis of Chronic congestive heart failure, unspecified heart failure type (HCC) was also pertinent to this visit.  Past Medical History:  has a past medical history of Hypertension.  Past Surgical History:  has a past surgical history that includes back surgery and hernia repair.    Assessment  Assessment: Patient is a 70 year old male who presents to IP facility for symptomatic bradycardia. Patient's past medical history includes migraines, HTN, HLD, HF, GERD, DMT2, and gout. Upon evaluation, patient demonstrated independence with bed mobility and transfers, along with good functional mobility endurance, only needing assistance for IV pole and telemtry, with patient reporting being at normal baseline regarding overall function. Patient also demonstrated full B LE strength and ROM. Due to these factors, this patient would not benefit from skilled IP PT at this time.  Treatment Diagnosis: Symptomatic bradycardia  Decision Making: Low Complexity  No Skilled PT: Independent with functional mobility   Requires PT Follow-Up: No  Activity Tolerance  Activity Tolerance: Patient tolerated evaluation without incident    Plan  Safety Devices  Type of Devices: Call light within reach, Gait belt, Left in bed, Nurse notified (Friend present; bed alarm not in place upon entry)  Restraints  Restraints Initially in Place: No    Restrictions  Restrictions/Precautions  Restrictions/Precautions: None  Activity Level: Up as Tolerated  Required Braces or Orthoses?: No     Subjective  General  Chart Reviewed: Yes  Patient assessed for rehabilitation services?:

## 2025-07-24 NOTE — DISCHARGE INSTRUCTIONS
Go next door to the [facility] where the [facility] office is located- you will get a heart monitor and schedule a follow up visit. To HOLD all cardiac meds EXCEPT your nifedipine (Procardia XL) for now, until further instructed.     Health Centers     [facility]   [phone]  [address], [address] [place] [address]   Monday - Friday 8 am to 5pm  (All insurances or no insurance with sliding scale payment)     [facility]   [phone]  [address], [address] ([facility])  Monday - Friday 8 am to 5 pm  (All insurances or no insurance with sliding scale payment)     [facility]  [phone]   [address]   Monday - Friday 8 am to 8 pm (Medical)  Monday - Friday 8 am to 4:30 pm (Dental)  (All insurances or no insurance with sliding scale payment)      The [facility] and [facility] are available for hospital follow up appointments. Follow-up appointments are important and should be scheduled BEFORE you leave the hospital. They will help keep you well and at home instead of in the hospital.      Please also schedule a New Patient Appointment with the Primary Care Provider (PCP) of your choice. It normally takes a few weeks to get an appointment with a new PCP, so call TODAY. The PCP's listed below are all known to be accepting new patients.   If issues arise, call your health insurance for in network PCP options.    [place] PCP    [facility] Physician Finder/Scheduling   [phone]    [facility]  [phone]  [address], [address]   [place], [place] [address]  (All insurances accepted)    [facility]   [phone]  [address] [place] [address]  (All insurances accepted)    [facility]   [phone]  [address] [place] [address]

## 2025-07-24 NOTE — CONSULTS
CARDIOLOGY CONSULT NOTE   Reason for consultation: Presyncope and bradycardia    Referring physician:  Jesse Gamez MD     Primary care physician: No primary care provider on file.      Dear  Dr. Jesse Gamez MD   Thanks for the consult.    Chief Complaints :  Chief Complaint   Patient presents with    Dizziness     X 5 DAYS    Chest Pain     X 5 DAYS        History of present illness:Malvin MARX is a 70 y.o.year old who presents with concerns for getting lightheaded and dizzy with history of chest pressure heaviness until he came to the emergency department is noted to have sinus bradycardia with heart rate in 30s was also found to be dehydrated with creatinine up to 1.6.  He has been on Bumex 0.5 mg daily, losartan and metoprolol XL 25 mg daily for years.  He normally sees cardiology in [place] has had 2 cardiac caths within the last 10 years which were normal due to ongoing chest pains.  Currently denying any pain overnight his metoprolol was held his TSH is normal his heart rate is up to 70s now he is not orthostatic anymore and currently symptom-free.  He said the chest pressure lasted several hours although his troponins are negative.  EKG shows sinus rhythm  According to wife he has had a very stressful last 1 to 2 weeks as several family members have been sick and passed away he has been attending funerals    Past medical history:    has a past medical history of Hypertension.  Past surgical history:   has a past surgical history that includes back surgery and hernia repair.  Social History:   reports that he has never smoked. He has never used smokeless tobacco. He reports that he does not drink alcohol.  Family history:   no family history of CAD, STROKE of DM at early age    Allergies   Allergen Reactions    Elemental Sulfur     Sulphasomidine Hives    Augmentin [Amoxicillin-Pot Clavulanate] Rash    Cephalosporins Rash    Pcn [Penicillins] Rash       atorvastatin (LIPITOR) tablet 40 mg,

## 2025-07-24 NOTE — PROGRESS NOTES
Per Dr. Slater, ok to dc pt home. Have pt come over to office after dc if within next 1-2 hours for monitor to be placed. MIKA Fernandez, notified.

## 2025-07-24 NOTE — PLAN OF CARE
Problem: Safety - Adult  Goal: Free from fall injury  7/24/2025 0929 by Morgan Kurtz RN  Outcome: Progressing  7/23/2025 2204 by Isaura Cardenas RN  Outcome: Progressing     Problem: Cardiovascular - Adult  Goal: Maintains optimal cardiac output and hemodynamic stability  7/24/2025 0929 by Morgan Kurtz RN  Outcome: Progressing  Flowsheets (Taken 7/24/2025 0915)  Maintains optimal cardiac output and hemodynamic stability:   Monitor blood pressure and heart rate   Monitor urine output and notify Licensed Independent Practitioner for values outside of normal range   Assess for signs of decreased cardiac output  7/23/2025 2204 by Isaura Cardenas RN  Outcome: Progressing  Goal: Absence of cardiac dysrhythmias or at baseline  7/24/2025 0929 by Morgan Kurtz RN  Outcome: Progressing  7/23/2025 2204 by Isaura Cardenas RN  Outcome: Progressing     Problem: Metabolic/Fluid and Electrolytes - Adult  Goal: Electrolytes maintained within normal limits  7/24/2025 0929 by Morgan Kurtz RN  Outcome: Progressing  7/23/2025 2204 by Isaura Cardenas RN  Outcome: Progressing  Goal: Glucose maintained within prescribed range  7/24/2025 0929 by Morgan Kurtz RN  Outcome: Progressing  7/23/2025 2204 by Isaura Cardenas RN  Outcome: Progressing     Problem: Hematologic - Adult  Goal: Maintains hematologic stability  7/24/2025 0929 by Morgan Kurtz RN  Outcome: Progressing  7/23/2025 2204 by Isaura Cardenas RN  Outcome: Progressing     Problem: Pain  Goal: Verbalizes/displays adequate comfort level or baseline comfort level  Outcome: Progressing

## 2025-07-24 NOTE — PROGRESS NOTES
4 Eyes Skin Assessment     NAME:  Malvin Fitzgerald  YOB: 1955  MEDICAL RECORD NUMBER:  [id number]    The patient is being assessed for  Admission    I agree that at least one RN has performed a thorough Head to Toe Skin Assessment on the patient. ALL assessment sites listed below have been assessed.      Areas assessed by both nurses:    Head, Face, Ears, Shoulders, Back, Chest, Arms, Elbows, Hands, Sacrum. Buttock, Coccyx, Ischium, and Legs. Feet and Heels        Does the Patient have a Wound? No noted wound(s)       Uvaldo Prevention initiated by RN: No  Wound Care Orders initiated by RN: No    For hospital-acquired stage 1 & 2 and ALL Stage 3,4, Unstageable, DTI, NWPT, and Complex wounds: place order “IP Wound Care/Ostomy Nurse Eval and Treat” by RN under : NA    New Ostomies, if present place, Ostomy referral order under : No     Nurse 1 eSignature: Electronically signed by Isaura Cardenas RN on 7/23/25 at 10:06 PM EDT    **SHARE this note so that the co-signing nurse can place an eSignature**    Nurse 2 eSignature: Electronically signed by Kusum Mcclendon RN on 7/23/25 at 10:07 PM EDT

## 2025-07-24 NOTE — DISCHARGE SUMMARY
V2.0  Discharge Summary    Name:  Malvin Fitzgerald /Age/Sex: 1955 (70 y.o. male)   Admit Date: 2025  Discharge Date: 25    MRN & CSN:  [id number] & [id number] Encounter Date and Time 25 1:31 PM EDT    Attending:  Obie Fuller MD Discharging Provider: DEEJAY SEGURA [facility] Course:     Brief HPI: Malvin Fitzgerald is a 70 y.o. male who presented with 5 days of dizziness and chest discomfort. Was found to have severe bradycardia and an NAGI. Was given IVF and the following meds were held: Toprol XL, Bumex, spironolactone, irbesartan and nifedipine xl. His kidney function is improved today along with dizziness and chest pain. Was seen by Cardiologist Dr. Slater who cleared him for discharge home and will follow up outpatient for a heart monitor and stress test. He is to hold all heard meds except for the Procardia XL until instructed by Cardiology.     Brief Problem Based Course:     # Symptomatic Bradycardia - resolved   # Mild Orthostatic Hypotension - improved   Presents with chest pressure and lightheadedness with a near-syncope episode after standing up  Suspect vasovagal episode due to decreased intravascular volume possible from over-diuresis  -ECG with marked sinus bradycardia, RBBB, rate of 33, no ST or Twave changes without hypotension, s/p atropine 0.5 mg and LR 1 Liter with resolution of bradycardia and sxs  -CXR non acute  - Troponin -> 22, 23  - He follows with cardiology in [place], records not accessible via EMR.  Patient states he has not had an echo in years  - , TSH 1.25, ECHO ordered   - Hold home metoprolol, bumex  - Continue LR MIVF for 10 hours  - Orthostatics are +: lying 148/94, sitting 133/81, standing 125/82  - Consulted Cardiology: Dr. Slater, to hold all cardiac meds except Procardia XL untl further instructed, go to office today and  7 day heart monitor, schedule stress test and follow up   -MISAEL Hose placed   -ECHO: per Dr. Slater no  been made to review the dictation as it is transcribed, on occasion the spoken word can be misinterpreted by the technology leading to omissions or inappropriate words, phrases or sentences.  Dictated and Electronically Signed By: Rebecca Lynch MD [facility] 7/23/2025 19:35          CBC:   Recent Labs     07/23/25 1905 07/24/25  0640   WBC 7.9 5.6   HGB 15.1 13.4*    167     BMP:    Recent Labs     07/23/25 1905 07/24/25  0640    139   K 5.0 5.1    107   CO2 19* 21   BUN 41* 32*   CREATININE 1.6* 1.3   GLUCOSE 117* 96     Hepatic:   Recent Labs     07/23/25 1905   AST 32   ALT 41*   BILITOT 0.3   ALKPHOS 142*     Lipids: No results found for: \"CHOL\", \"HDL\", \"TRIG\"  Hemoglobin A1C:   Lab Results   Component Value Date/Time    LABA1C 6.1 07/24/2025 06:40 AM     TSH:   Lab Results   Component Value Date/Time    TSH 1.25 07/23/2025 07:05 PM     Troponin: No results found for: \"TROPONINT\"  Lactic Acid: No results for input(s): \"LACTA\" in the last 72 hours.  BNP:   Recent Labs     07/23/25 1905   PROBNP 150*     UA:  Lab Results   Component Value Date/Time    COLORU Yellow 03/20/2025 04:36 PM    PHUR 5.5 03/20/2025 04:36 PM    CLARITYU Clear 03/20/2025 04:36 PM    SPECGRAV 1.050 03/20/2025 04:36 PM    LEUKOCYTESUR Negative 03/20/2025 04:36 PM    BILIRUBINUR Negative 03/20/2025 04:36 PM    BLOODU Negative 03/20/2025 04:36 PM    GLUCOSEU Negative 03/20/2025 04:36 PM    KETUA Negative 03/20/2025 04:36 PM     Urine Cultures: No results found for: \"LABURIN\"  Blood Cultures: No results found for: \"BC\"  No results found for: \"BLOODCULT2\"  Organism: No results found for: \"ORG\"    Time Spent Discharging patient 34 minutes    Electronically signed by DEEJAY SEGURA CNP on 7/24/2025 at 1:31 PM

## 2025-07-24 NOTE — PROGRESS NOTES
I personally SAW the patient and discussed the patient with the CHRISS and was available for questions and consultation as needed. I made/approved the management plan and take responsibility for the patient management today.    History:   Patient states for the last five days he has been having dizziness when standing up. Yesterday he was getting out of the car and in addition to feeling dizzy he felt that he was going to pass out. He had mild chest pressure. No palpitations. He had been eating and drinking appropriately. He denies any fever. No recent changes in medications. He is on metoprolol and nifedipine for a long time. He does have sleep apnea. He has never had an MI or stent placed in his heart. He has not had a recent stress test.     Exam:   No acute distress  S1s2 regular rate  Cta b/l bs+  Abd soft nt nd  No pitting edema in LE    MDM:   ***    Obie Fuller MD  7/24/25

## 2025-07-24 NOTE — ED NOTES
ED TO INPATIENT SBAR HANDOFF    Patient Name: Malvin Fitzgerald   :  1955  70 y.o.   Preferred Name:  Malvin  Family/Caregiver Present no   Restraints no   C-SSRS: Risk of Suicide: No Risk  Sitter no   Sepsis Risk Score        Situation  Chief Complaint   Patient presents with    Dizziness     X 5 DAYS    Chest Pain     X 5 DAYS     Brief Description of Patient's Condition: Chest pain and and dizziness x 5 days  Mental Status: oriented, alert, coherent, logical, thought processes intact, and able to concentrate and follow conversation  Arrived from: home    Imaging:   XR CHEST PORTABLE   Final Result        Abnormal labs:   Abnormal Labs Reviewed   COMPREHENSIVE METABOLIC PANEL W/ REFLEX TO MG FOR LOW K - Abnormal; Notable for the following components:       Result Value    CO2 19 (*)     Glucose 117 (*)     BUN 41 (*)     Creatinine 1.6 (*)     Est, Glom Filt Rate 46 (*)     Alkaline Phosphatase 142 (*)     ALT 41 (*)     All other components within normal limits   CBC WITH AUTO DIFFERENTIAL - Abnormal; Notable for the following components:    Monocytes % 14 (*)     Immature Granulocytes % 1 (*)     All other components within normal limits   TROPONIN - Abnormal; Notable for the following components:    Troponin, High Sensitivity 23 (*)     All other components within normal limits        Background  History:   Past Medical History:   Diagnosis Date    Hypertension        Assessment    Vitals:    Level of Consciousness: Alert (0)   Vitals:    25 2100 25 2101 25 21025   BP:   126/70    Pulse: 73 72 72 73   Resp: 24 25 19 27   Temp:       TempSrc:       SpO2: 95% 93% 94% 94%   Weight:       Height:           PO Status: Regular    O2 Flow Rate: O2 Device: None (Room air)      Cardiac Rhythm: [] Not on telemetry. [] NSR. [] Sinus Tachycardia. [x] Sinus Bradycardia. [] Other:     Last documented pain medication administered: [] Tylenol, [] Norco, [] Percocet, [] Morphine, [] Toradol,

## 2025-07-24 NOTE — PLAN OF CARE
Problem: Safety - Adult  Goal: Free from fall injury  Outcome: Progressing     Problem: Cardiovascular - Adult  Goal: Maintains optimal cardiac output and hemodynamic stability  Outcome: Progressing  Goal: Absence of cardiac dysrhythmias or at baseline  Outcome: Progressing     Problem: Metabolic/Fluid and Electrolytes - Adult  Goal: Electrolytes maintained within normal limits  Outcome: Progressing  Goal: Glucose maintained within prescribed range  Outcome: Progressing     Problem: Hematologic - Adult  Goal: Maintains hematologic stability  Outcome: Progressing

## 2025-07-24 NOTE — PROGRESS NOTES
Patient has his own cpap unit at the bedside.  Pt uses nightly during sleep hrs.  Patient relates he is able to use and place his mask on own and will notify if any further assistance needed.  Sp02 93%. HR 68 and RR 17 bpm on room air.  RN Isaura goodwin and NP Malvin aware patient has own cpap.

## 2025-07-24 NOTE — H&P
V2.0  History and Physical      Name:  Malvin Fitzgerald /Age/Sex: 1955  (70 y.o. male)   MRN & CSN:  [id number] & [id number] Encounter Date/Time: 2025 10:51 PM EDT   Location:   PCP: No primary care provider on file.       Hospital Day: 1    Assessment and Plan:   Malvin Fitzgerald is a 70 y.o. male with a past medical history of migraines, HTN, HLD, ?HF, GERD, DMT2, gout who presents with symptomatic bradycardia.    #Symptomatic bradycardia  Presents with chest pressure and lightheadedness with a near-syncope episode after standing up  Suspect vasovagal episode due to decreased intravascular volume possible from over-diuresis  -ECG with marked sinus bradycardia, RBBB, rate of 33, no ST or Twave changes without hypotension, s/p atropine 0.5 mg and LR 1 Liter with resolution of bradycardia and sxs  -CXR non acute  - Troponin -> 22, 23  - He follows with cardiology in [place], records not accessible via EMR.  Patient states he has not had an echo in years  - Check BNP, TSH, Echocardiogram  - Hold home metoprolol, bumex  - Continue LR MIVF for 10 hours  - Check orthostatics  - Admit for observation status  - Would consider cardiology consult if recurrent bradycardia  - PT/OT evals     #Suspected Acute kidney injury vs NAGI/CKD  Likely pre-renal 2/2 over-diuresis  No outside records available.  Patient receives medical care in [place]  CO2 19, BUN 41, Creatinine 1.6 (No baseline Cr available), troponin 22, 23  S/p 1 L LR in ED.  Continue LR for 10 hours gentle IV hydration  Recheck BMP in AM  Avoid nephrotoxic medications: hold home meloxicam, losartan, allopurinal  Strict intake/output, bladder scan    Migraines - Continue Elavil    Hypertension - Continue nifedipine, hold losartan ordered as irbesartan    Hyperlipidemia - Continue Lipitor    ?Heart failure with unspecified ejection fraction - Patient follows with cardiology clinic in [place].  States he doesn't know his cardiac problems, but  Oral Daily    [Held by provider] losartan  100 mg Oral Daily    [START ON 7/24/2025] pantoprazole  40 mg Oral QAM AC    [Held by provider] bumetanide  2 mg Oral See Admin Instructions    [Held by provider] metoprolol succinate  25 mg Oral Daily    [Held by provider] meloxicam  15 mg Oral Daily    allopurinol  400 mg Oral QPM      Infusions:    sodium chloride      lactated ringers       PRN Meds: sodium chloride flush, 5-40 mL, PRN  sodium chloride, , PRN  ondansetron, 4 mg, Q8H PRN   Or  ondansetron, 4 mg, Q6H PRN  polyethylene glycol, 17 g, Daily PRN  acetaminophen, 650 mg, Q6H PRN   Or  acetaminophen, 650 mg, Q6H PRN        Labs    CBC:   Recent Labs     07/23/25 1905   WBC 7.9   HGB 15.1        BMP:    Recent Labs     07/23/25 1905      K 5.0      CO2 19*   BUN 41*   CREATININE 1.6*   GLUCOSE 117*     Hepatic:   Recent Labs     07/23/25 1905   AST 32   ALT 41*   BILITOT 0.3   ALKPHOS 142*     Lipids: No results found for: \"CHOL\", \"HDL\", \"TRIG\"  Hemoglobin A1C: No results found for: \"LABA1C\"  TSH: No results found for: \"TSH\"  Troponin: No results found for: \"TROPONINT\"  Lactic Acid: No results for input(s): \"LACTA\" in the last 72 hours.  BNP: No results for input(s): \"PROBNP\" in the last 72 hours.  UA:  Lab Results   Component Value Date/Time    COLORU Yellow 03/20/2025 04:36 PM    PHUR 5.5 03/20/2025 04:36 PM    CLARITYU Clear 03/20/2025 04:36 PM    SPECGRAV 1.050 03/20/2025 04:36 PM    LEUKOCYTESUR Negative 03/20/2025 04:36 PM    BILIRUBINUR Negative 03/20/2025 04:36 PM    BLOODU Negative 03/20/2025 04:36 PM    GLUCOSEU Negative 03/20/2025 04:36 PM    KETUA Negative 03/20/2025 04:36 PM     Urine Cultures: No results found for: \"LABURIN\"  Blood Cultures: No results found for: \"BC\"  No results found for: \"BLOODCULT2\"  Organism: No results found for: \"ORG\"    Imaging/Diagnostics Last 24 Hours   XR CHEST PORTABLE  Result Date: 7/23/2025  EXAMINATION: XR CHEST PORTABLE DATE OF EXAM:  7/23/2025

## 2025-07-24 NOTE — CARE COORDINATION
07/24/25 0652   Service Assessment   Patient Orientation Alert and Oriented   Cognition Alert   History Provided By Patient   Primary Caregiver Self   Support Systems Spouse/Significant Other   Patient's Healthcare Decision Maker is: Patient Declined (Legal Next of Kin Remains as Decision Maker)   PCP Verified by CM No  (Does not have a PCP locally, physician is in [place])   Prior Functional Level Independent in ADLs/IADLs   Current Functional Level Independent in ADLs/IADLs   Can patient return to prior living arrangement Yes   Ability to make needs known: Good   Family able to assist with home care needs: Yes   Would you like for me to discuss the discharge plan with any other family members/significant others, and if so, who? Yes  (Wife)   Financial Resources Medicare   Social/Functional History   Lives With Spouse   Type of Home House   Home Equipment None   Receives Help From Other (Comment)  (Independent)   Active  Yes   Discharge Planning   Type of Residence House   Living Arrangements Spouse/Significant Other   Current Services Prior To Admission C-pap   Potential Assistance Needed N/A   Potential Assistance Purchasing Medications No   Patient expects to be discharged to: House   Services At/After Discharge   Services At/After Discharge None   Mode of Transport at Discharge Other (see comment)  (Wife)   Confirm Follow Up Transport Family   Condition of Participation: Discharge Planning   The Plan for Transition of Care is related to the following treatment goals: Plans to return home   The Patient and/or Patient Representative was provided with a Choice of Provider? Patient   The Patient and/Or Patient Representative agree with the Discharge Plan? Yes   Freedom of Choice list was provided with basic dialogue that supports the patient's individualized plan of care/goals, treatment preferences, and shares the quality data associated with the providers?  Yes     CM met with the patient to initiate

## 2025-08-11 ENCOUNTER — TELEPHONE (OUTPATIENT)
Dept: CARDIOLOGY CLINIC | Age: 70
End: 2025-08-11

## 2025-08-18 ENCOUNTER — TELEPHONE (OUTPATIENT)
Dept: CARDIOTHORACIC SURGERY | Age: 70
End: 2025-08-18

## 2025-08-21 ENCOUNTER — OFFICE VISIT (OUTPATIENT)
Dept: CARDIOLOGY CLINIC | Age: 70
End: 2025-08-21
Payer: MEDICARE

## 2025-08-21 VITALS
SYSTOLIC BLOOD PRESSURE: 126 MMHG | WEIGHT: 234.6 LBS | BODY MASS INDEX: 31.77 KG/M2 | DIASTOLIC BLOOD PRESSURE: 84 MMHG | HEIGHT: 72 IN | HEART RATE: 74 BPM

## 2025-08-21 DIAGNOSIS — R00.1 SYMPTOMATIC BRADYCARDIA: ICD-10-CM

## 2025-08-21 DIAGNOSIS — R07.2 PRECORDIAL PAIN: ICD-10-CM

## 2025-08-21 DIAGNOSIS — I10 PRIMARY HYPERTENSION: ICD-10-CM

## 2025-08-21 DIAGNOSIS — I48.0 PAROXYSMAL ATRIAL FIBRILLATION (HCC): Primary | ICD-10-CM

## 2025-08-21 PROBLEM — I48.91 A-FIB (HCC): Status: ACTIVE | Noted: 2025-08-21

## 2025-08-21 PROCEDURE — 3017F COLORECTAL CA SCREEN DOC REV: CPT | Performed by: INTERNAL MEDICINE

## 2025-08-21 PROCEDURE — G8417 CALC BMI ABV UP PARAM F/U: HCPCS | Performed by: INTERNAL MEDICINE

## 2025-08-21 PROCEDURE — 3079F DIAST BP 80-89 MM HG: CPT | Performed by: INTERNAL MEDICINE

## 2025-08-21 PROCEDURE — 1036F TOBACCO NON-USER: CPT | Performed by: INTERNAL MEDICINE

## 2025-08-21 PROCEDURE — 1160F RVW MEDS BY RX/DR IN RCRD: CPT | Performed by: INTERNAL MEDICINE

## 2025-08-21 PROCEDURE — 3074F SYST BP LT 130 MM HG: CPT | Performed by: INTERNAL MEDICINE

## 2025-08-21 PROCEDURE — 1124F ACP DISCUSS-NO DSCNMKR DOCD: CPT | Performed by: INTERNAL MEDICINE

## 2025-08-21 PROCEDURE — G8427 DOCREV CUR MEDS BY ELIG CLIN: HCPCS | Performed by: INTERNAL MEDICINE

## 2025-08-21 PROCEDURE — 99214 OFFICE O/P EST MOD 30 MIN: CPT | Performed by: INTERNAL MEDICINE

## 2025-08-21 PROCEDURE — 1159F MED LIST DOCD IN RCRD: CPT | Performed by: INTERNAL MEDICINE

## 2025-08-21 RX ORDER — METOPROLOL TARTRATE 25 MG/1
25 TABLET, FILM COATED ORAL 2 TIMES DAILY
Qty: 60 TABLET | Refills: 3 | Status: SHIPPED | OUTPATIENT
Start: 2025-08-21

## 2025-08-21 RX ORDER — METOPROLOL TARTRATE 25 MG/1
25 TABLET, FILM COATED ORAL 2 TIMES DAILY
Qty: 180 TABLET | OUTPATIENT
Start: 2025-08-21

## 2025-09-02 ENCOUNTER — OFFICE VISIT (OUTPATIENT)
Age: 70
End: 2025-09-02
Payer: MEDICARE

## 2025-09-02 VITALS
OXYGEN SATURATION: 95 % | HEART RATE: 65 BPM | SYSTOLIC BLOOD PRESSURE: 132 MMHG | WEIGHT: 235 LBS | DIASTOLIC BLOOD PRESSURE: 78 MMHG | BODY MASS INDEX: 31.83 KG/M2 | HEIGHT: 72 IN

## 2025-09-02 DIAGNOSIS — L23.7 POISON IVY DERMATITIS: Primary | ICD-10-CM

## 2025-09-02 PROCEDURE — 99213 OFFICE O/P EST LOW 20 MIN: CPT | Performed by: NURSE PRACTITIONER

## 2025-09-02 PROCEDURE — 3017F COLORECTAL CA SCREEN DOC REV: CPT | Performed by: NURSE PRACTITIONER

## 2025-09-02 PROCEDURE — 1160F RVW MEDS BY RX/DR IN RCRD: CPT | Performed by: NURSE PRACTITIONER

## 2025-09-02 PROCEDURE — G8417 CALC BMI ABV UP PARAM F/U: HCPCS | Performed by: NURSE PRACTITIONER

## 2025-09-02 PROCEDURE — 3078F DIAST BP <80 MM HG: CPT | Performed by: NURSE PRACTITIONER

## 2025-09-02 PROCEDURE — 1159F MED LIST DOCD IN RCRD: CPT | Performed by: NURSE PRACTITIONER

## 2025-09-02 PROCEDURE — G8427 DOCREV CUR MEDS BY ELIG CLIN: HCPCS | Performed by: NURSE PRACTITIONER

## 2025-09-02 PROCEDURE — 1036F TOBACCO NON-USER: CPT | Performed by: NURSE PRACTITIONER

## 2025-09-02 PROCEDURE — 1124F ACP DISCUSS-NO DSCNMKR DOCD: CPT | Performed by: NURSE PRACTITIONER

## 2025-09-02 PROCEDURE — 3075F SYST BP GE 130 - 139MM HG: CPT | Performed by: NURSE PRACTITIONER

## 2025-09-02 RX ORDER — METHYLPREDNISOLONE 4 MG/1
TABLET ORAL
Qty: 1 KIT | Refills: 0 | Status: SHIPPED | OUTPATIENT
Start: 2025-09-02

## 2025-09-02 ASSESSMENT — ENCOUNTER SYMPTOMS
EYE PAIN: 0
SORE THROAT: 0
SHORTNESS OF BREATH: 0
DIARRHEA: 0
COUGH: 0
VOMITING: 0
RHINORRHEA: 0
NAIL CHANGES: 0

## 2025-09-03 ENCOUNTER — TELEPHONE (OUTPATIENT)
Dept: CARDIOLOGY CLINIC | Age: 70
End: 2025-09-03